# Patient Record
Sex: FEMALE | Race: OTHER | HISPANIC OR LATINO | URBAN - METROPOLITAN AREA
[De-identification: names, ages, dates, MRNs, and addresses within clinical notes are randomized per-mention and may not be internally consistent; named-entity substitution may affect disease eponyms.]

---

## 2021-01-11 ENCOUNTER — EMERGENCY (EMERGENCY)
Facility: HOSPITAL | Age: 41
LOS: 1 days | Discharge: HOME | End: 2021-01-11
Admitting: EMERGENCY MEDICINE
Payer: MEDICARE

## 2021-01-11 VITALS
SYSTOLIC BLOOD PRESSURE: 126 MMHG | OXYGEN SATURATION: 100 % | RESPIRATION RATE: 18 BRPM | HEART RATE: 80 BPM | TEMPERATURE: 99 F | DIASTOLIC BLOOD PRESSURE: 79 MMHG

## 2021-01-11 LAB — SARS-COV-2 RNA SPEC QL NAA+PROBE: SIGNIFICANT CHANGE UP

## 2021-01-11 PROCEDURE — 99283 EMERGENCY DEPT VISIT LOW MDM: CPT

## 2021-01-11 PROCEDURE — U0003: CPT

## 2021-01-11 PROCEDURE — U0005: CPT

## 2021-01-20 NOTE — ED PROVIDER NOTE - PATIENT PORTAL LINK FT
You can access the FollowMyHealth Patient Portal offered by [facility] by registering at the following website: [email or web address]. By joining [facility] [facility] in [place]’s FollowMyHealth portal, you will also be able to view your health information using other applications (apps) compatible with our system.

## 2021-01-20 NOTE — ED PROVIDER NOTE - NSFOLLOWUPINSTRUCTIONS_ED_ALL_ED_FT
Covid discharge instructions handout given  return to ED if you experience shortness of breath or chest pain  follow up with primary care doctor in 24 hours

## 2021-01-26 ENCOUNTER — EMERGENCY (EMERGENCY)
Facility: HOSPITAL | Age: 41
LOS: 1 days | Discharge: HOME | End: 2021-01-26
Payer: MEDICARE

## 2021-01-26 VITALS
SYSTOLIC BLOOD PRESSURE: 118 MMHG | TEMPERATURE: 98 F | OXYGEN SATURATION: 100 % | HEIGHT: 62 IN | RESPIRATION RATE: 18 BRPM | WEIGHT: 149.91 LBS | DIASTOLIC BLOOD PRESSURE: 85 MMHG | HEART RATE: 79 BPM

## 2021-01-26 LAB — SARS-COV-2 RNA SPEC QL NAA+PROBE: SIGNIFICANT CHANGE UP

## 2021-01-26 PROCEDURE — U0005: CPT

## 2021-01-26 PROCEDURE — 99283 EMERGENCY DEPT VISIT LOW MDM: CPT

## 2021-01-26 PROCEDURE — U0003: CPT

## 2021-01-26 NOTE — ED PROVIDER NOTE - PATIENT PORTAL LINK FT
You can access the FollowMyHealth Patient Portal offered by [facility] by registering at the following website: [email or web address]. By joining [employer]’s FollowMyHealth portal, you will also be able to view your health information using other applications (apps) compatible with our system.

## 2021-01-26 NOTE — ED PROVIDER NOTE - PHYSICAL EXAMINATION
Vital signs noted, see flowsheet.  General: NAD, well appearing, non-toxic.  HEENT: NC/AT. MMM. Eyes clear b/l  Neck: Supple  Respiratory: No distress  Skin: No evidence of rash  Neuro: Awake, alert, oriented

## 2021-01-26 NOTE — ED PROVIDER NOTE - OBJECTIVE STATEMENT
Pt presenting to the ED for COVID-19 testing. Pt reports she was exposed at work 1 month ago. Denies new known exposure. Denies any symptoms. Denies fever, chills, body aches, rash, loss of taste/smell, HA, neck pain, ear pain, URI sxs, sore throat, cough, weakness, dizziness, LOC, CP, SOB, palpitations, NVD, abd pain, urinary sxs. Pt reports eating and drinking normal diet. Normal output. No limit of ADLs. Pt requesting testing at this time.

## 2021-02-12 NOTE — ED PROVIDER NOTE - CLINICAL SUMMARY MEDICAL DECISION MAKING FREE TEXT BOX
Pt presenting for COVID testing  -Pt does not meet current COVID-19 criteria listed in most updated guidelines as per [facility] protocol/algorithm for admission at this time   -Lengthy discussion with patient regarding home quarantine, follow up with PMD, anticipatory guidance provided and supportive care recommended. Advised immediate return if worsening symptoms, strict return precautions, especially if short of breath, chest pain, inability to tolerate food/liquid. Pt given pre-printed [facility] Novel Coronavirus (COVID19) information packet in Persian which contains instructions on time frame of result and how to obtain. Pt verbalized understanding and agreement of plan.   ED  Alan Valadez utilized throughout encounter
none

## 2024-07-22 NOTE — ED PROVIDER NOTE - NS ED MD DISPO DISCHARGE
Administered By (Optional): Dr Samanta Covington
How Many Mls Were Removed From The 40 Mg/Ml (1ml) Vial When Preparing The Injectable Solution?: 0
Consent: The risks of atrophy were reviewed with the patient.
Detail Level: Simple
Validate Note Data When Using Inventory: Yes
Medical Necessity Clause: This procedure was medically necessary because the lesions that were treated were:
Require Ndc Code?: No
Total Volume (Ccs): 0.2
Kenalog Preparation: Kenalog
Kenalog Type Of Vial: Multiple Dose
Which Kenalog Vial Was Used?: Kenalog 10 mg/ml (5 ml vial)
Concentration Of Kenalog Solution Injected (Mg/Ml): 10.0
Home

## 2025-06-29 NOTE — ED ADULT TRIAGE NOTE - INTERNATIONAL TRAVEL
No You were seen in the emergency department for evaluation after a fall from her chair.  Please take caution when leaning from your chair especially if it is not secured as you can easily fall out leading to injury.  At this time head CT was performed with no acute findings.  We strongly recommend you follow up with the primary care provider for continued monitoring and evaluation would call on 06/30/2025.  Please return to the emergency department if there is a change in mentation, increased weakness, nausea, vomiting, fever, or any other concerns you believe she will be evaluated by emergency room provider.